# Patient Record
(demographics unavailable — no encounter records)

---

## 2024-12-24 NOTE — ASSESSMENT
[FreeTextEntry1] : #Seb Derm - Flaring Diagnosis reviewed Keto shampoo TIW alternating with Neutragena T-gel Clobetasol solution BID x 2 weeks PRN itch  #Eczema - flaring Diagnosis reviewed [place] emollient use reviewde Start TAC cream BID x 2 weeks on/1 week off. SED [place] emollient use reviewed  #Inflammatory Acne -  Given current eczema flare, will treat acne conservatively so as to not irritate the skin further For now, start BPO wash QOD in the shower  RTC 3 months

## 2024-12-24 NOTE — HISTORY OF PRESENT ILLNESS
[FreeTextEntry1] : eczema, acne [de-identified] : 22M here for  1) eczema on the neck, back and chest. Itchy. Moisturizing infrequently 2) acne on the chest and back 3) scaling in the scalp  SHx: Senior at

## 2024-12-24 NOTE — PHYSICAL EXAM
[Alert] : alert [Oriented x 3] : ~L oriented x 3 [Well Nourished] : well nourished [Conjunctiva Non-injected] : conjunctiva non-injected [No Visual Lymphadenopathy] : no visual  lymphadenopathy [No Clubbing] : no clubbing [No Edema] : no edema [No Bromhidrosis] : no bromhidrosis [No Chromhidrosis] : no chromhidrosis [FreeTextEntry3] : Thin erythematous scaly patches on the chest, neck, back with hyperpigmented macules on the neck, upper back and chest Greasy scale in the scalp

## 2025-05-19 NOTE — HEALTH RISK ASSESSMENT
[Good] : ~his/her~  mood as  good [No] : In the past 12 months have you used drugs other than those required for medical reasons? 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

## 2025-05-19 NOTE — ASSESSMENT
[FreeTextEntry1] : HCM: Doing well. Check titres and labwork for school. Will discuss results.   [Vaccines Reviewed] : Immunizations reviewed today. Please see immunization details in the vaccine log within the immunization flowsheet.

## 2025-07-08 NOTE — PHYSICAL EXAM
[TextEntry] : CONSTITUTIONAL: alert and oriented in NAD NEURO: AAOx3; Sensory/Motor Intact RESPIRATORY: respirations non labored CV: regular rate SKIN: -edema - Rash - Pigmentation - Ulcer If yes: Location: (_) Right (_) Left Description: _ MSKL: (+) Ankle ROM intact VASCULAR: Superficial phlebitis? [] Yes (+) No PULSE EXAM: + distal pulses bulging VV calf

## 2025-07-08 NOTE — HISTORY OF PRESENT ILLNESS
[FreeTextEntry1] : History of present illness: 21yo M who presents for follow-up for symptomatic left lower extremity varicose veins. He has bulging calf veins and hx of eczema in area.   What factors precipitate symptoms?  x Prolonged standing Prolonged sitting Walking Exercise  Symptoms have altered activities of daily living by: x Unable to stand for prolonged period of time _ Difficulty completing work tasks. Does the patient's daily activity require prolonged standing? x Yes No How many times over a 2 week period do they take OTC meds? x 0-1 2-4 4-6 Conservative therapy: Has the patient previously implemented any conservative therapy or OTC medication for alleviation of symptoms related to varicose veins? x Leg elevation x Walking and other forms of exercise _ Weight reduction _ NSAIDs or Pain meds    Graduated, elasticized compression stockings: x Yes _ No  If yes, what kind? x Knee-high _ Thigh high   x 20-30mmHg _ 30-40mmHg

## 2025-07-08 NOTE — PLAN
[TextEntry] : conservative vs surgical management discussed with patient and his mother. Will proceed with L GSV RFA SAP UGS. Rx provided for compression stockings. They do not wish to pursue treatment of the RLE at this time.